# Patient Record
Sex: MALE | Race: BLACK OR AFRICAN AMERICAN | Employment: FULL TIME | URBAN - METROPOLITAN AREA
[De-identification: names, ages, dates, MRNs, and addresses within clinical notes are randomized per-mention and may not be internally consistent; named-entity substitution may affect disease eponyms.]

---

## 2020-02-25 ENCOUNTER — APPOINTMENT (OUTPATIENT)
Dept: CT IMAGING | Age: 35
End: 2020-02-25
Attending: EMERGENCY MEDICINE
Payer: OTHER GOVERNMENT

## 2020-02-25 ENCOUNTER — APPOINTMENT (OUTPATIENT)
Dept: GENERAL RADIOLOGY | Age: 35
End: 2020-02-25
Attending: EMERGENCY MEDICINE
Payer: OTHER GOVERNMENT

## 2020-02-25 ENCOUNTER — HOSPITAL ENCOUNTER (EMERGENCY)
Age: 35
Discharge: HOME | End: 2020-02-25
Attending: EMERGENCY MEDICINE
Payer: OTHER GOVERNMENT

## 2020-02-25 VITALS
BODY MASS INDEX: 21.19 KG/M2 | OXYGEN SATURATION: 100 % | TEMPERATURE: 98 F | SYSTOLIC BLOOD PRESSURE: 105 MMHG | RESPIRATION RATE: 19 BRPM | HEIGHT: 67 IN | DIASTOLIC BLOOD PRESSURE: 61 MMHG | WEIGHT: 135 LBS | HEART RATE: 55 BPM

## 2020-02-25 DIAGNOSIS — G35 MS (MULTIPLE SCLEROSIS) (HCC): ICD-10-CM

## 2020-02-25 DIAGNOSIS — R55 VASOVAGAL EPISODE: Primary | ICD-10-CM

## 2020-02-25 LAB
ANION GAP SERPL CALC-SCNC: 5 MMOL/L (ref 3–18)
BASOPHILS # BLD: 0 K/UL (ref 0–0.1)
BASOPHILS NFR BLD: 0 % (ref 0–2)
BUN SERPL-MCNC: 18 MG/DL (ref 7–18)
BUN/CREAT SERPL: 17 (ref 12–20)
CALCIUM SERPL-MCNC: 8.3 MG/DL (ref 8.5–10.1)
CHLORIDE SERPL-SCNC: 101 MMOL/L (ref 100–111)
CK MB CFR SERPL CALC: NORMAL % (ref 0–4)
CK MB SERPL-MCNC: <1 NG/ML (ref 5–25)
CK SERPL-CCNC: 215 U/L (ref 39–308)
CO2 SERPL-SCNC: 30 MMOL/L (ref 21–32)
CREAT SERPL-MCNC: 1.09 MG/DL (ref 0.6–1.3)
DIFFERENTIAL METHOD BLD: ABNORMAL
EOSINOPHIL # BLD: 0 K/UL (ref 0–0.4)
EOSINOPHIL NFR BLD: 0 % (ref 0–5)
ERYTHROCYTE [DISTWIDTH] IN BLOOD BY AUTOMATED COUNT: 15.1 % (ref 11.6–14.5)
GLUCOSE BLD STRIP.AUTO-MCNC: 154 MG/DL (ref 70–110)
GLUCOSE SERPL-MCNC: 122 MG/DL (ref 74–99)
HCT VFR BLD AUTO: 42.3 % (ref 36–48)
HGB BLD-MCNC: 13.5 G/DL (ref 13–16)
LYMPHOCYTES # BLD: 2.1 K/UL (ref 0.9–3.6)
LYMPHOCYTES NFR BLD: 34 % (ref 21–52)
MAGNESIUM SERPL-MCNC: 2.2 MG/DL (ref 1.6–2.6)
MCH RBC QN AUTO: 26.8 PG (ref 24–34)
MCHC RBC AUTO-ENTMCNC: 31.9 G/DL (ref 31–37)
MCV RBC AUTO: 83.9 FL (ref 74–97)
MONOCYTES # BLD: 1.1 K/UL (ref 0.05–1.2)
MONOCYTES NFR BLD: 17 % (ref 3–10)
NEUTS SEG # BLD: 2.9 K/UL (ref 1.8–8)
NEUTS SEG NFR BLD: 49 % (ref 40–73)
PLATELET # BLD AUTO: 109 K/UL (ref 135–420)
PMV BLD AUTO: 11.7 FL (ref 9.2–11.8)
POTASSIUM SERPL-SCNC: 3.9 MMOL/L (ref 3.5–5.5)
RBC # BLD AUTO: 5.04 M/UL (ref 4.7–5.5)
SODIUM SERPL-SCNC: 136 MMOL/L (ref 136–145)
TROPONIN I SERPL-MCNC: <0.02 NG/ML (ref 0–0.04)
WBC # BLD AUTO: 6.1 K/UL (ref 4.6–13.2)

## 2020-02-25 PROCEDURE — 85025 COMPLETE CBC W/AUTO DIFF WBC: CPT

## 2020-02-25 PROCEDURE — 96360 HYDRATION IV INFUSION INIT: CPT

## 2020-02-25 PROCEDURE — 74011250636 HC RX REV CODE- 250/636: Performed by: EMERGENCY MEDICINE

## 2020-02-25 PROCEDURE — 82962 GLUCOSE BLOOD TEST: CPT

## 2020-02-25 PROCEDURE — 83735 ASSAY OF MAGNESIUM: CPT

## 2020-02-25 PROCEDURE — 99285 EMERGENCY DEPT VISIT HI MDM: CPT

## 2020-02-25 PROCEDURE — 93005 ELECTROCARDIOGRAM TRACING: CPT

## 2020-02-25 PROCEDURE — 80048 BASIC METABOLIC PNL TOTAL CA: CPT

## 2020-02-25 PROCEDURE — 96361 HYDRATE IV INFUSION ADD-ON: CPT

## 2020-02-25 PROCEDURE — 82550 ASSAY OF CK (CPK): CPT

## 2020-02-25 PROCEDURE — 71046 X-RAY EXAM CHEST 2 VIEWS: CPT

## 2020-02-25 PROCEDURE — 70450 CT HEAD/BRAIN W/O DYE: CPT

## 2020-02-25 RX ADMIN — SODIUM CHLORIDE 1000 ML: 900 INJECTION, SOLUTION INTRAVENOUS at 19:43

## 2020-02-25 NOTE — ED TRIAGE NOTES
Pt [place] per EMS from car wash post syncopal episode, pt reports last thing he remembers was standing up against the wall and feeling funny and then waking up with the paramedics. EMS reports pt was sinus eve and pale in the field. Pt unaware of head injury but was told he hit his head, admits to headache but states \"its not from the fall. \" EMS report /50s, admin 200 cc NS bolus,  mg/dL en route

## 2020-02-25 NOTE — LETTER
NOTIFICATION RETURN TO WORK / SCHOOL 
 
2/25/2020 9:29 PM 
 
Mr. Jose Osullivan 
[place] [address] To Whom It May Concern: 
 
Jose Osullivan is currently under the care of THE [facility] EMERGENCY DEPT. He will return to work/school on: 2/28/2020 If there are questions or concerns please have the contact our the department. Sincerely, 
 
 
 
Dr. Jessa Mason

## 2020-02-26 LAB
ATRIAL RATE: 52 BPM
CALCULATED P AXIS, ECG09: 72 DEGREES
CALCULATED R AXIS, ECG10: 81 DEGREES
CALCULATED T AXIS, ECG11: 53 DEGREES
DIAGNOSIS, 93000: NORMAL
P-R INTERVAL, ECG05: 146 MS
Q-T INTERVAL, ECG07: 398 MS
QRS DURATION, ECG06: 96 MS
QTC CALCULATION (BEZET), ECG08: 370 MS
VENTRICULAR RATE, ECG03: 52 BPM

## 2020-02-26 NOTE — ED PROVIDER NOTES
EMERGENCY DEPARTMENT HISTORY AND PHYSICAL EXAM    Date: 2/25/2020  Patient Name: Nik Amado    History of Presenting Illness     Chief Complaint   Patient presents with    Syncope         History Provided By: Patient    Additional History (Context):   7:21 PM    Nik Amado is a 27 y.o. male with PMHX of [address] who presents to the emergency department via EMS C/O syncopal episode. Pt reports the last thing he remembers was being at the car wash, and leaning against the wall because he started \"feeling funny\" and then woke up to the paramedics. Pt has associated sxs of nausea following syncopal episode, and headache. Per EMS pt was sinus eve and pale in the field. EMS reports /50s, Pt has experienced one prior syncopal episode when he had the flu. Pt denies chest pain, vomiting, diarrhea, dysuria and difficulty urinating. Pt notes recently having chills and a subjective fever. Pt does not currently see a neurologist and notes MS diagnosis was in  [place] x 3 years ago. Pt had a MRI and spinal tap done when in [place] and notes MRI did not show any abnormalities . Pt notes using natural remedies to treat MS and has episodes lasting seconds of blurriness and vision loss due to MS. Pt is L hand dominant. Social History  Pt notes tobacco use. No ETOH use. No illicit drug use. Family History  MS (Mother)    PCP: None        Past History     Past Medical History:  Past Medical History:   Diagnosis Date    MS (multiple sclerosis) ([facility] [address]        Past Surgical History:  History reviewed. No pertinent surgical history. Family History:  History reviewed. No pertinent family history.     Social History:  Social History     Tobacco Use    Smoking status: Current Every Day Smoker     Packs/day: 0.50    Smokeless tobacco: Never Used   Substance Use Topics    Alcohol use: Not on file    Drug use: Never       Allergies:  No Known Allergies      Review of Systems   Review of Systems   Constitutional: Positive for chills and fever (Subjective). Eyes: Positive for visual disturbance (Blurry, Loss of vision Secondary to MS). Cardiovascular: Negative for chest pain. Gastrointestinal: Positive for nausea. Negative for diarrhea and vomiting. Genitourinary: Negative for difficulty urinating and dysuria. Neurological: Positive for syncope and headaches. All other systems reviewed and are negative. Physical Exam     Vitals:    02/25/20 1900 02/25/20 1902 02/25/20 1906 02/25/20 1915   BP: 104/62 105/64 102/57 105/61   Pulse: 62 (!) 56 63 (!) 55   Resp:    19   Temp:       SpO2:    100%   Weight:       Height:         Physical Exam  Vitals signs and nursing note reviewed. Constitutional:       General: He is not in acute distress. Appearance: Normal appearance. He is well-developed. He is not diaphoretic. Comments: Minor abrasion to R shoulder blade, overlying the spine, with minimal tenderness   HENT:      Head: Normocephalic and atraumatic. Comments: No blood or fluid from ears or nose     Right Ear: External ear normal.      Left Ear: External ear normal.      Mouth/Throat:      Pharynx: No oropharyngeal exudate. Eyes:      General: No scleral icterus. Conjunctiva/sclera: Conjunctivae normal.      Pupils: Pupils are equal, round, and reactive to light. Comments: Pupils 4mm   Neck:      Musculoskeletal: Normal range of motion and neck supple. Thyroid: No thyromegaly. Vascular: No JVD. Trachea: No tracheal deviation. Cardiovascular:      Rate and Rhythm: Normal rate and regular rhythm. Heart sounds: Normal heart sounds. Pulmonary:      Effort: Pulmonary effort is normal. No respiratory distress. Breath sounds: Normal breath sounds. No stridor. Abdominal:      General: Bowel sounds are normal. There is no distension. Palpations: Abdomen is soft. Tenderness: There is no abdominal tenderness. There is no guarding or rebound.    Musculoskeletal: Normal range of motion. General: Tenderness (R shoulder blade) present. Comments: Minor abrasion to R shoulder blade, overlying the spine, with minimal tenderness   Lymphadenopathy:      Cervical: No cervical adenopathy. Skin:     General: Skin is warm and dry. Findings: No erythema or rash. Neurological:      Mental Status: He is alert and oriented to person, place, and time. Cranial Nerves: No cranial nerve deficit. Motor: No pronator drift. Coordination: Coordination normal.      Deep Tendon Reflexes: Reflexes are normal and symmetric. Reflexes normal.      Comments: Normal rapid alternating movements, and fine motor coordination. Psychiatric:         Behavior: Behavior normal.         Thought Content: Thought content normal.         Judgment: Judgment normal.             Diagnostic Study Results     Labs -     Lab Results         Contains abnormal data GLUCOSE, POC (Final result)    Component (Lab Inquiry)   Collection Time Result Time GULPOC   02/25/20 19:02:00 02/25/20 19:04:16 154  Notified RN or MD jarrett. [facility]        Final result                        Contains abnormal data CBC WITH AUTOMATED DIFF (Final result)    Component (Lab Inquiry)   Collection Time Result Time WBC RBC HGB HCT MCV   02/25/20 18:52:00 02/25/20 19:54:02 6.1 5.04 13.5 42.3 83.9       Collection Time Result Time MCH MCHC RDW PLT MPLV   02/25/20 18:52:00 02/25/20 19:54:02 26.8 31.9 15.1High  109Low  11.7       Collection Time Result Time GRANS LYMPH MONOS EOS BASOS   02/25/20 18:52:00 02/25/20 19:54:02 49 34 17High  0 0       Collection Time Result Time ABG ABL ABM KATELYNN ABB   02/25/20 18:52:00 02/25/20 19:54:02 2.9 2.1 1.1 0.0 0.0       Collection Time Result Time DF   02/25/20 18:52:00 02/25/20 19:54:02 AUTOMATED       Final result                        Contains abnormal data METABOLIC PANEL, BASIC (Final result)    Component (Lab Inquiry)   Collection Time Result Time NA K CL CO2 AGAP   02/25/20 18:52:00 02/25/20 20:02:56 136 3.9 101 30 5       Collection Time Result Time GLU BUN CREA BUCR GFRAA   02/25/20 18:52:00 02/25/20 20:02:56 122High  18 1.09 17 >60       Collection Time Result Time GFRNA CA   02/25/20 18:52:00 02/25/20 20:02:56 >60  (NOTE)   Estimated GFR . .. 8.3Low        Final result                        MAGNESIUM (Final result)    Component (Lab Inquiry)   Collection Time Result Time MG   02/25/20 18:52:00 02/25/20 20:03:07 2.2         Final result                          CARDIAC PANEL,(CK, CKMB & TROPONIN) (Final result)    Component (Lab Inquiry)   Collection Time Result Time CPK CKRMB CKND1 TROQR   02/25/20 18:52:00 02/25/20 19:59:44 215 <1.0 CALCULATION NOT PERFORMED WHEN RESULT IS BELOW LINEAR LIMIT <0.02  The presence of detect. ..         Final result               Radiologic Studies -   CT HEAD WO CONT   Final Result   IMPRESSION:      No acute intracranial abnormality. XR CHEST PA LAT   Final Result   IMPRESSION:      No active cardiopulmonary disease. CT Results  (Last 48 hours)    None        CXR Results  (Last 48 hours)    None          Medications given in the ED-  Medications   sodium chloride 0.9 % bolus infusion 1,000 mL (0 mL IntraVENous IV Completed 2/25/20 2132)         Medical Decision Making   I am the first provider for this patient. I reviewed the vital signs, available nursing notes, past medical history, past surgical history, family history and social history. Vital Signs-Reviewed the patient's vital signs. Pulse Oximetry Analysis - 100% on RA     Cardiac Monitor:  Rate: 62 bpm  Rhythm: Sinus Bradycardia     EKG interpretation: (Preliminary)  Sinus bradycardia, 52 bpm  No blockages, no arrhythmia, no ischemia   EKG read by Juan Francisco Kc MD at 6:42 PM     Records Reviewed: NURSING NOTES AND PREVIOUS MEDICAL RECORDS    Provider Notes (Medical Decision Making):   Syncope without abnormal EKG. Unlikely to be arrhythmia or cardiogenic.  Given MS history and fall, will scan brain. C-Spine is clear by NEXUS and unlikely to be MS or spinal column related. Will give fluids and disposition pending results. Procedures:  Procedures    ED Course:   7:18 PM: Initial assessment performed. The patients presenting problems have been discussed, and they are in agreement with the care plan formulated and outlined with them. I have encouraged them to ask questions as they arise throughout their visit. Diagnosis and Disposition       DISCHARGE NOTE:  9:23 PM    Mukul Reid's  results have been reviewed with him. He has been counseled regarding his diagnosis, treatment, and plan. He verbally conveys understanding and agreement of the signs, symptoms, diagnosis, treatment and prognosis and additionally agrees to follow up as discussed. He also agrees with the care-plan and conveys that all of his questions have been answered. I have also provided discharge instructions for him that include: educational information regarding their diagnosis and treatment, and list of reasons why they would want to return to the ED prior to their follow-up appointment, should his condition change. He has been provided with education for proper emergency department utilization. CLINICAL IMPRESSION:    1. Vasovagal episode    2. MS (multiple sclerosis) ([facility] [address]        PLAN:  1. D/C Home  2. There are no discharge medications for this patient. 3.   Follow-up Information     Follow up With Specialties Details Why Contact Marco Antonio SCHMITZ  Schedule an appointment as soon as possible for a visit in 2 days Follow up with [phone]    [facility] EMERGENCY DEPT Emergency Medicine Go to As needed, If symptoms worsen [address] [address]  [phone]        _______________________________    This note was partially transcribed via voice recognition software.  Although efforts have been made to catch any discrepancies, it may contain sound alike words, grammatical errors, or nonsensical words. Attestations: This note is prepared by PERRY , acting as Scribe for Skyler Santos MD .    Arnel. Yarely Santos MD :  The scribe's documentation has been prepared under my direction and personally reviewed by me in its entirety. I confirm that the note above accurately reflects all work, treatment, procedures, and medical decision making performed by me.

## 2020-02-26 NOTE — DISCHARGE INSTRUCTIONS
Vasovagal Syncope: Care Instructions  Your Care Instructions    Vasovagal syncope (say \"qpb-psb-NEXLazara WARREN-kuh-pee\")is sudden dizziness or fainting that can be set off by things such as pain, stress, fear, or trauma. You may sweat or feel lightheaded, sick to your stomach, or tingly. The problem causes the heart rate to slow and the blood vessels to widen, or dilate, for a short time. When this happens, blood pools in the lower body, and less blood goes to the brain. You can usually get relief by lying down with your legs raised (elevated). This helps more blood to flow to your brain and may help relieve symptoms like feeling dizzy. Some doctors may recommend a technique that involves tensing your fists and arms. This type of fainting is often easy to predict. For example, it happens to some people when they see blood or have to get a shot. They may feel symptoms before they faint. An episode of vasovagal syncope usually responds well to self-care. Other treatment often isn't needed. But if the fainting keeps happening, your doctor may suggest further treatments. Follow-up care is a key part of your treatment and safety. Be sure to make and go to all appointments, and call your doctor if you are having problems. It's also a good idea to know your test results and keep a list of the medicines you take. How can you care for yourself at home? · Drink plenty of fluids to prevent dehydration. If you have kidney, heart, or liver disease and have to limit fluids, talk with your doctor before you increase your fluid intake. · Try to avoid things that you think may set off vasovagal syncope. · Talk to your doctor about any medicines you take. Some medicines may increase the chance of this condition occurring. · If you feel symptoms, lie down with your legs raised. Talk to your doctor about what to do if your symptoms come back. When should you call for help?   Call 911 anytime you think you may need emergency care. For example, call if:    · You have symptoms of a heart problem. These may include:  ? Chest pain or pressure. ? Severe trouble breathing. ? A fast or irregular heartbeat.    Watch closely for changes in your health, and be sure to contact your doctor if:    · You have more episodes of fainting at home.     · You do not get better as expected. Where can you learn more? Go to http://jeane-kamilla.info/. Enter L754 in the search box to learn more about \"Vasovagal Syncope: Care Instructions. \"  Current as of: June 26, 2019  Content Version: 12.2  © 3213-5470 [employer]. Care instructions adapted under license by [employer] (which disclaims liability or warranty for this information). If you have questions about a medical condition or this instruction, always ask your healthcare professional. [address] any warranty or liability for your use of this information.

## 2022-10-27 ENCOUNTER — HOSPITAL ENCOUNTER (EMERGENCY)
Age: 37
Discharge: HOME | End: 2022-10-27
Attending: EMERGENCY MEDICINE
Payer: OTHER GOVERNMENT

## 2022-10-27 VITALS
TEMPERATURE: 97.9 F | OXYGEN SATURATION: 100 % | SYSTOLIC BLOOD PRESSURE: 132 MMHG | HEIGHT: 68 IN | WEIGHT: 135 LBS | BODY MASS INDEX: 20.46 KG/M2 | RESPIRATION RATE: 16 BRPM | DIASTOLIC BLOOD PRESSURE: 72 MMHG | HEART RATE: 72 BPM

## 2022-10-27 DIAGNOSIS — R51.9 LEFT FACIAL PRESSURE AND PAIN: Primary | ICD-10-CM

## 2022-10-27 PROCEDURE — 74011250636 HC RX REV CODE- 250/636: Performed by: EMERGENCY MEDICINE

## 2022-10-27 PROCEDURE — 99284 EMERGENCY DEPT VISIT MOD MDM: CPT

## 2022-10-27 PROCEDURE — 96372 THER/PROPH/DIAG INJ SC/IM: CPT

## 2022-10-27 RX ORDER — KETOROLAC TROMETHAMINE 30 MG/ML
30 INJECTION, SOLUTION INTRAMUSCULAR; INTRAVENOUS
Status: COMPLETED | OUTPATIENT
Start: 2022-10-27 | End: 2022-10-27

## 2022-10-27 RX ADMIN — KETOROLAC TROMETHAMINE 30 MG: 30 INJECTION, SOLUTION INTRAMUSCULAR at 19:35

## 2022-10-27 NOTE — ED TRIAGE NOTES
Patient c/o left side of face and jaw pain reports that he has popping in ears. States he injured his left jaw many years ago during basketball injury.  Patient reports that it started hurting him about 3 days ago

## 2022-10-27 NOTE — ED PROVIDER NOTES
67-year-old male states he has no medical problems presents to the emergency department with left facial plain since 2005 when he got struck in the face while playing basketball. This is a chronic issue he has had it evaluated he has been put on pain medications. Patient came in today for another evaluation. He is taken Excedrin for the pain. No new injuries. Patient has no problems opening closing his mouth. No issues of biting or speaking. No redness or dental pain. No swelling to his face. No other issues expressed. Patient states when he was at triage he was told that he may have trigeminal neuralgia patient wants an evaluation and follow-up for this now. Past Medical History:   Diagnosis Date    MS (multiple sclerosis) ([facility]        History reviewed. No pertinent surgical history. History reviewed. No pertinent family history. Social History     Socioeconomic History    Marital status: SINGLE     Spouse name: Not on file    Number of children: Not on file    Years of education: Not on file    Highest education level: Not on file   Occupational History    Not on file   Tobacco Use    Smoking status: Every Day     Packs/day: 0.50     Types: Cigarettes    Smokeless tobacco: Never   Substance and Sexual Activity    Alcohol use: Never    Drug use: Never    Sexual activity: Not on file   Other Topics Concern    Not on file   Social History Narrative    Not on file     Social Determinants of Health     Financial Resource Strain: Not on file   Food Insecurity: Not on file   Transportation Needs: Not on file   Physical Activity: Not on file   Stress: Not on file   Social Connections: Not on file   Intimate Partner Violence: Not on file   Housing Stability: Not on file         ALLERGIES: Patient has no known allergies.     Review of Systems    Vitals:    10/27/22 1840   BP: 132/72   Pulse: 72   Resp: 16   Temp: 97.9 °F (36.6 °C)   SpO2: 100%   Weight: 61.2 kg (135 lb)   Height: 5' 8\" (1.727 m) Physical Exam  Vitals and nursing note reviewed. Constitutional:       General: He is not in acute distress. Appearance: He is well-developed. He is not diaphoretic. HENT:      Head: Normocephalic and atraumatic. Nose: Nose normal. No congestion or rhinorrhea. Mouth/Throat:      Mouth: Mucous membranes are moist.   Eyes:      General: No scleral icterus. Right eye: No discharge. Left eye: No discharge. Conjunctiva/sclera: Conjunctivae normal.      Pupils: Pupils are equal, round, and reactive to light. Neck:      Vascular: No JVD. Cardiovascular:      Rate and Rhythm: Normal rate and regular rhythm. Pulmonary:      Effort: Pulmonary effort is normal. No respiratory distress. Breath sounds: Normal breath sounds. No wheezing. Abdominal:      General: Bowel sounds are normal.      Palpations: Abdomen is soft. There is no mass. Tenderness: There is no abdominal tenderness. There is no left CVA tenderness or rebound. Hernia: No hernia is present. Musculoskeletal:         General: Normal range of motion. Cervical back: Normal range of motion and neck supple. Skin:     General: Skin is warm and dry. Neurological:      Mental Status: He is alert and oriented to person, place, and time. MDM  Number of Diagnoses or Management Options  Left facial pressure and pain  Diagnosis management comments: This is a chronic issue. Patient states he has had imaging for this before. Patient will get anti-inflammatory and follow-up with ENT. He wants evaluation for trigeminal neuralgia.     Differential diagnosis facial pain cellulitis abscess trigeminal neuralgia           Procedures

## 2022-11-25 ENCOUNTER — HOSPITAL ENCOUNTER (EMERGENCY)
Age: 37
Discharge: HOME | End: 2022-11-25
Attending: EMERGENCY MEDICINE
Payer: OTHER GOVERNMENT

## 2022-11-25 VITALS
BODY MASS INDEX: 20.92 KG/M2 | WEIGHT: 138 LBS | DIASTOLIC BLOOD PRESSURE: 79 MMHG | OXYGEN SATURATION: 99 % | RESPIRATION RATE: 18 BRPM | TEMPERATURE: 97.3 F | SYSTOLIC BLOOD PRESSURE: 137 MMHG | HEIGHT: 68 IN | HEART RATE: 59 BPM

## 2022-11-25 DIAGNOSIS — R68.84 JAW PAIN: Primary | ICD-10-CM

## 2022-11-25 PROCEDURE — 96372 THER/PROPH/DIAG INJ SC/IM: CPT

## 2022-11-25 PROCEDURE — 99284 EMERGENCY DEPT VISIT MOD MDM: CPT

## 2022-11-25 PROCEDURE — 74011250636 HC RX REV CODE- 250/636: Performed by: PHYSICIAN ASSISTANT

## 2022-11-25 RX ORDER — KETOROLAC TROMETHAMINE 15 MG/ML
15 INJECTION, SOLUTION INTRAMUSCULAR; INTRAVENOUS
Status: COMPLETED | OUTPATIENT
Start: 2022-11-25 | End: 2022-11-25

## 2022-11-25 RX ADMIN — KETOROLAC TROMETHAMINE 15 MG: 15 INJECTION, SOLUTION INTRAMUSCULAR; INTRAVENOUS at 19:57

## 2022-11-25 NOTE — ED TRIAGE NOTES
Pt states \" I am having jaw pain I did try to follow up but my appt is a week or so away and I am having bad pain.  Last time they gave me a Toradol shot and it felt much better,\"

## 2022-11-26 NOTE — ED PROVIDER NOTES
EMERGENCY DEPARTMENT HISTORY AND PHYSICAL EXAM    Date: 11/25/2022  Patient Name: Suzette Forbes    History of Presenting Illness     Chief Complaint   Patient presents with    Jaw Pain         History Provided By: Patient    8:08 PM  Suzette Forbes is a 44 y.o. male who presents to the emergency department C/O left jaw pain. Patient states he has had about 4 episodes over the last several years with sudden onset sharp stabbing electric type shock pain in his left jaw. Current episode began yesterday and persisted today. He states he is mostly pain-free in between the shocks of pain. .  Was seen here just over a month ago for similar pain and had a Toradol injection that completely relieved his pain. He has seen his dentist for this. Told that he may have trigeminal neuralgia and has follow-up with an ENT next month. Denies any injury or trauma jaw swelling dental pain headache vision changes ear pain hearing changes neck pain sore throat. PCP: OLLIE Blandon        Past History     Past Medical History:  Past Medical History:   Diagnosis Date    MS (multiple sclerosis) [facility])        Past Surgical History:  No past surgical history on file. Family History:  No family history on file. Social History:  Social History     Tobacco Use    Smoking status: Every Day     Packs/day: 0.50     Types: Cigarettes    Smokeless tobacco: Never   Substance Use Topics    Alcohol use: Never    Drug use: Never       Allergies:  No Known Allergies      Review of Systems   Review of Systems   Constitutional: Negative. Negative for fever. HENT:  Negative for dental problem, facial swelling, sore throat and trouble swallowing. Musculoskeletal:  Positive for arthralgias. Negative for neck pain. All other systems reviewed and are negative.       Physical Exam     Vitals:    11/25/22 1844   BP: 137/79   Pulse: (!) 59   Resp: 18   Temp: 97.3 °F (36.3 °C)   SpO2: 99%   Weight: 62.6 kg (138 lb)   Height: 5' 8\" (1.727 m) Physical Exam  Vital signs and nursing notes reviewed. CONSTITUTIONAL: Alert. Well-appearing; well-nourished; in no apparent distress. HEAD: Normocephalic; atraumatic. EYES: PERRL; Conjunctiva clear. ENT: TM's normal. External ear normal. Normal nose; no rhinorrhea. Normal pharynx. Tonsils not enlarged without exudate. TMJ nontender, no malocclusion or trismus. Several decayed and missing teeth. No gum swelling or teeth tenderness. Moist mucus membranes. NECK: Supple; FROM without difficulty, non-tender; no cervical lymphadenopathy. CV: Normal S1, S2; no murmurs, rubs, or gallops. No chest wall tenderness. RESPIRATORY: Normal chest excursion with respiration; breath sounds clear and equal bilaterally; no wheezes, rhonchi, or rales. SKIN: Normal for age and race; warm; dry; good turgor; no apparent lesions or exudate. NEURO: A & O x3. Cranial nerves 2-12 intact. Motor 5/5 bilaterally. Sensation intact. PSYCH:  Mood and affect appropriate. Diagnostic Study Results     Labs -   No results found for this or any previous visit (from the past 12 hour(s)). Radiologic Studies -   No orders to display     CT Results  (Last 48 hours)      None          CXR Results  (Last 48 hours)      None            Medications given in the ED-  Medications   ketorolac (TORADOL) injection 15 mg (15 mg IntraMUSCular Given 11/25/22 1957)         Medical Decision Making   I am the first provider for this patient. I reviewed the vital signs, available nursing notes, past medical history, past surgical history, family history and social history. Vital Signs-Reviewed the patient's vital signs. Records Reviewed: Nursing Notes      Procedures:  Procedures    ED Course:  8:28 PM   Initial assessment performed. The patients presenting problems have been discussed, and they are in agreement with the care plan formulated and outlined with them.   I have encouraged them to ask questions as they arise throughout their visit. Provider Notes (Medical Decision Making): Diana Boyle is a 44 y.o. male presents with left intermittent sharp jaw pain since yesterday. Had similar symptoms in the past.  Presents today requesting a Toradol shot. No signs of secondary infection or pain/tenderness now. Has a working diagnosis of trigeminal neuralgia but is awaiting ENT follow-up next week. Will give Toradol injection and follow-up with ENT and return ED if any worsening or change in symptoms    Diagnosis and Disposition       DISCHARGE NOTE:    Mukul Reid's  results have been reviewed with him. He has been counseled regarding his diagnosis, treatment, and plan. He verbally conveys understanding and agreement of the signs, symptoms, diagnosis, treatment and prognosis and additionally agrees to follow up as discussed. He also agrees with the care-plan and conveys that all of his questions have been answered. I have also provided discharge instructions for him that include: educational information regarding their diagnosis and treatment, and list of reasons why they would want to return to the ED prior to their follow-up appointment, should his condition change. He has been provided with education for proper emergency department utilization. CLINICAL IMPRESSION:    1. Jaw pain        PLAN:  1. D/C Home  2. There are no discharge medications for this patient. 3.   Follow-up Information       Follow up With Specialties Details Why Contact Info    OLLIE Coles Nurse Practitioner Schedule an appointment as soon as possible for a visit       [facility] EMERGENCY DEPT Emergency Medicine  As needed, If symptoms worsen [address] [address]  [phone]          _______________________________      Please note that this dictation was completed with [employer], the [employer] voice recognition software.   Quite often unanticipated grammatical, syntax, homophones, and other interpretive errors are inadvertently transcribed by the computer software. Please disregard these errors. Please excuse any errors that have escaped final proofreading.

## 2023-03-24 ENCOUNTER — HOSPITAL ENCOUNTER (EMERGENCY)
Facility: HOSPITAL | Age: 38
Discharge: HOME | End: 2023-03-24
Attending: STUDENT IN AN ORGANIZED HEALTH CARE EDUCATION/TRAINING PROGRAM
Payer: OTHER GOVERNMENT

## 2023-03-24 VITALS
DIASTOLIC BLOOD PRESSURE: 78 MMHG | BODY MASS INDEX: 22.73 KG/M2 | WEIGHT: 150 LBS | HEART RATE: 67 BPM | SYSTOLIC BLOOD PRESSURE: 106 MMHG | RESPIRATION RATE: 12 BRPM | TEMPERATURE: 97.1 F | OXYGEN SATURATION: 100 % | HEIGHT: 68 IN

## 2023-03-24 DIAGNOSIS — J02.9 ACUTE PHARYNGITIS, UNSPECIFIED ETIOLOGY: Primary | ICD-10-CM

## 2023-03-24 PROCEDURE — 99283 EMERGENCY DEPT VISIT LOW MDM: CPT | Performed by: PHYSICIAN ASSISTANT

## 2023-03-24 RX ORDER — AMOXICILLIN 875 MG/1
875 TABLET, COATED ORAL 2 TIMES DAILY
Qty: 20 TABLET | Refills: 0 | Status: SHIPPED | OUTPATIENT
Start: 2023-03-24 | End: 2023-04-03

## 2023-03-24 NOTE — ED NOTES
Pt presents for 2 day of sore throat.  Denies fever, rash, NVD, cough     Akua Garcia [facility]  03/24/23 1126

## 2023-03-24 NOTE — ED PROVIDER NOTES
[facility] EMERGENCY DEPT  EMERGENCY DEPARTMENT ENCOUNTER       Pt Name: Cameron Arndt  MRN: [id number]  [place] 1985  Date of evaluation: 3/24/2023  PCP: SERENITY Rowley NP  Note Started: 8:39 AM 3/24/23     CHIEF COMPLAINT       Chief Complaint   Patient presents with    Pharyngitis        HISTORY OF PRESENT ILLNESS: 1 or more elements      History From: Patient  HPI Limitations: None  Chronic Conditions: none  Social Determinants affecting Dx or Tx: none      Averytis Alecia Erma Silveira is a 44 y.o. male who presents to ED c/o sore throat x2 days. No associated symptoms. Believes he has strep. Patient denies fever, ear pain, difficulty swallowing, cough, congestion and any other symptoms or complaints     Nursing Notes were all reviewed and agreed with or any disagreements were addressed in the HPI. PAST HISTORY     Past Medical History:  Past Medical History:   Diagnosis Date    MS (multiple sclerosis) ([address] (Pt states he does not have MS)        Past Surgical History:  No past surgical history on file. Family History:  No family history on file. Social History:  Social History     Socioeconomic History    Marital status: Single   Tobacco Use    Smoking status: Every Day     Packs/day: 0.50     Types: Cigarettes    Smokeless tobacco: Never   Substance and Sexual Activity    Alcohol use: Never    Drug use: Never       Allergies:  No Known Allergies    CURRENT MEDICATIONS      No current facility-administered medications for this encounter. Current Outpatient Medications   Medication Sig Dispense Refill    amoxicillin (AMOXIL) 875 MG tablet Take 1 tablet by mouth 2 times daily for 10 days 20 tablet 0          PHYSICAL EXAM      Vitals:    03/24/23 0815   BP: 106/78   Pulse: 67   Resp: 12   Temp: 97.1 °F (36.2 °C)   TempSrc: Temporal   SpO2: 100%   Weight: 150 lb (68 kg)   Height: 5' 8\" (1.727 m)     Physical Exam  Vital signs and nursing notes reviewed. CONSTITUTIONAL: Alert.  Well-appearing;